# Patient Record
Sex: MALE | Race: WHITE | NOT HISPANIC OR LATINO | URBAN - METROPOLITAN AREA
[De-identification: names, ages, dates, MRNs, and addresses within clinical notes are randomized per-mention and may not be internally consistent; named-entity substitution may affect disease eponyms.]

---

## 2017-01-10 ENCOUNTER — OUTPATIENT (OUTPATIENT)
Dept: OUTPATIENT SERVICES | Facility: HOSPITAL | Age: 77
LOS: 1 days | End: 2017-01-10
Payer: MEDICARE

## 2017-01-10 PROCEDURE — 70551 MRI BRAIN STEM W/O DYE: CPT

## 2017-01-10 PROCEDURE — 70551 MRI BRAIN STEM W/O DYE: CPT | Mod: 26

## 2017-01-11 ENCOUNTER — APPOINTMENT (OUTPATIENT)
Dept: OPHTHALMOLOGY | Facility: CLINIC | Age: 77
End: 2017-01-11

## 2017-02-01 ENCOUNTER — APPOINTMENT (OUTPATIENT)
Dept: OPHTHALMOLOGY | Facility: CLINIC | Age: 77
End: 2017-02-01

## 2017-03-23 ENCOUNTER — APPOINTMENT (OUTPATIENT)
Dept: NEUROLOGY | Facility: CLINIC | Age: 77
End: 2017-03-23

## 2017-04-12 ENCOUNTER — APPOINTMENT (OUTPATIENT)
Dept: OPHTHALMOLOGY | Facility: CLINIC | Age: 77
End: 2017-04-12

## 2017-04-27 ENCOUNTER — APPOINTMENT (OUTPATIENT)
Dept: NEUROLOGY | Facility: CLINIC | Age: 77
End: 2017-04-27

## 2017-04-27 VITALS
WEIGHT: 161 LBS | HEIGHT: 74 IN | SYSTOLIC BLOOD PRESSURE: 98 MMHG | BODY MASS INDEX: 20.66 KG/M2 | HEART RATE: 59 BPM | TEMPERATURE: 96.3 F | DIASTOLIC BLOOD PRESSURE: 62 MMHG | OXYGEN SATURATION: 99 %

## 2017-04-27 DIAGNOSIS — R41.3 OTHER AMNESIA: ICD-10-CM

## 2017-05-16 ENCOUNTER — OTHER (OUTPATIENT)
Age: 77
End: 2017-05-16

## 2017-06-07 ENCOUNTER — APPOINTMENT (OUTPATIENT)
Dept: OPHTHALMOLOGY | Facility: CLINIC | Age: 77
End: 2017-06-07

## 2017-06-07 DIAGNOSIS — H35.371 PUCKERING OF MACULA, RIGHT EYE: ICD-10-CM

## 2017-06-08 RX ORDER — TRAVOPROST 0.04 MG/ML
0 SOLUTION/ DROPS OPHTHALMIC
Qty: 2 | Refills: 11 | Status: ACTIVE | COMMUNITY
Start: 2017-02-01 | End: 1900-01-01

## 2017-10-03 ENCOUNTER — APPOINTMENT (OUTPATIENT)
Dept: HEART AND VASCULAR | Facility: CLINIC | Age: 77
End: 2017-10-03
Payer: MEDICARE

## 2017-10-03 VITALS
TEMPERATURE: 98.7 F | DIASTOLIC BLOOD PRESSURE: 66 MMHG | SYSTOLIC BLOOD PRESSURE: 100 MMHG | HEIGHT: 74 IN | RESPIRATION RATE: 12 BRPM | WEIGHT: 162 LBS | OXYGEN SATURATION: 95 % | HEART RATE: 64 BPM | BODY MASS INDEX: 20.79 KG/M2

## 2017-10-03 DIAGNOSIS — R42 DIZZINESS AND GIDDINESS: ICD-10-CM

## 2017-10-03 DIAGNOSIS — F32.9 MAJOR DEPRESSIVE DISORDER, SINGLE EPISODE, UNSPECIFIED: ICD-10-CM

## 2017-10-03 DIAGNOSIS — H40.9 UNSPECIFIED GLAUCOMA: ICD-10-CM

## 2017-10-03 PROCEDURE — 93000 ELECTROCARDIOGRAM COMPLETE: CPT

## 2017-10-03 PROCEDURE — 99214 OFFICE O/P EST MOD 30 MIN: CPT | Mod: 25

## 2017-10-03 PROCEDURE — 36415 COLL VENOUS BLD VENIPUNCTURE: CPT

## 2017-10-03 RX ORDER — BUPROPION HYDROCHLORIDE 75 MG/1
75 TABLET, FILM COATED ORAL
Qty: 60 | Refills: 3 | Status: DISCONTINUED | COMMUNITY
Start: 2017-04-27 | End: 2017-10-03

## 2017-10-03 RX ORDER — BUPROPION HYDROCHLORIDE 100 MG/1
100 TABLET, FILM COATED, EXTENDED RELEASE ORAL TWICE DAILY
Qty: 60 | Refills: 0 | Status: ACTIVE | COMMUNITY
Start: 2017-09-26

## 2017-10-04 ENCOUNTER — APPOINTMENT (OUTPATIENT)
Dept: OPHTHALMOLOGY | Facility: CLINIC | Age: 77
End: 2017-10-04
Payer: MEDICARE

## 2017-10-04 DIAGNOSIS — H35.371 PUCKERING OF MACULA, RIGHT EYE: ICD-10-CM

## 2017-10-04 DIAGNOSIS — H43.813 VITREOUS DEGENERATION, BILATERAL: ICD-10-CM

## 2017-10-04 LAB
25(OH)D3 SERPL-MCNC: 31.5 NG/ML
ALBUMIN SERPL ELPH-MCNC: 4.3 G/DL
ALP BLD-CCNC: 55 U/L
ALT SERPL-CCNC: 17 U/L
ANION GAP SERPL CALC-SCNC: 15 MMOL/L
APPEARANCE: CLEAR
AST SERPL-CCNC: 21 U/L
BACTERIA: NEGATIVE
BASOPHILS # BLD AUTO: 0.03 K/UL
BASOPHILS NFR BLD AUTO: 0.7 %
BILIRUB SERPL-MCNC: 0.3 MG/DL
BILIRUBIN URINE: NEGATIVE
BLOOD URINE: NEGATIVE
BUN SERPL-MCNC: 29 MG/DL
CALCIUM SERPL-MCNC: 10 MG/DL
CHLORIDE SERPL-SCNC: 106 MMOL/L
CHOLEST SERPL-MCNC: 160 MG/DL
CHOLEST/HDLC SERPL: 3.1 RATIO
CO2 SERPL-SCNC: 24 MMOL/L
COLOR: YELLOW
CREAT SERPL-MCNC: 1.34 MG/DL
EOSINOPHIL # BLD AUTO: 0.19 K/UL
EOSINOPHIL NFR BLD AUTO: 4.1 %
GLUCOSE QUALITATIVE U: NEGATIVE MG/DL
GLUCOSE SERPL-MCNC: 70 MG/DL
HBA1C MFR BLD HPLC: 5.7 %
HCT VFR BLD CALC: 40.3 %
HDLC SERPL-MCNC: 52 MG/DL
HGB BLD-MCNC: 12.6 G/DL
IMM GRANULOCYTES NFR BLD AUTO: 0.2 %
KETONES URINE: NEGATIVE
LDLC SERPL CALC-MCNC: 96 MG/DL
LEUKOCYTE ESTERASE URINE: NEGATIVE
LYMPHOCYTES # BLD AUTO: 1.02 K/UL
LYMPHOCYTES NFR BLD AUTO: 22.2 %
MAGNESIUM SERPL-MCNC: 2.3 MG/DL
MAN DIFF?: NORMAL
MCHC RBC-ENTMCNC: 30.2 PG
MCHC RBC-ENTMCNC: 31.3 GM/DL
MCV RBC AUTO: 96.6 FL
MICROSCOPIC-UA: NORMAL
MONOCYTES # BLD AUTO: 0.48 K/UL
MONOCYTES NFR BLD AUTO: 10.5 %
NEUTROPHILS # BLD AUTO: 2.86 K/UL
NEUTROPHILS NFR BLD AUTO: 62.3 %
NITRITE URINE: NEGATIVE
PH URINE: 6.5
PLATELET # BLD AUTO: 151 K/UL
POTASSIUM SERPL-SCNC: 4.8 MMOL/L
PROT SERPL-MCNC: 6.7 G/DL
PROTEIN URINE: NEGATIVE MG/DL
RBC # BLD: 4.17 M/UL
RBC # FLD: 14.2 %
RED BLOOD CELLS URINE: 6 /HPF
SODIUM SERPL-SCNC: 145 MMOL/L
SPECIFIC GRAVITY URINE: 1.02
SQUAMOUS EPITHELIAL CELLS: 0 /HPF
TRIGL SERPL-MCNC: 62 MG/DL
TSH SERPL-ACNC: 0.97 UIU/ML
UROBILINOGEN URINE: NEGATIVE MG/DL
WBC # FLD AUTO: 4.59 K/UL
WHITE BLOOD CELLS URINE: 1 /HPF

## 2017-10-04 PROCEDURE — 92014 COMPRE OPH EXAM EST PT 1/>: CPT

## 2017-10-04 PROCEDURE — 92133 CPTRZD OPH DX IMG PST SGM ON: CPT

## 2017-10-12 ENCOUNTER — APPOINTMENT (OUTPATIENT)
Dept: HEART AND VASCULAR | Facility: CLINIC | Age: 77
End: 2017-10-12
Payer: MEDICARE

## 2017-10-12 ENCOUNTER — APPOINTMENT (OUTPATIENT)
Dept: HEART AND VASCULAR | Facility: CLINIC | Age: 77
End: 2017-10-12

## 2017-10-12 VITALS
BODY MASS INDEX: 20.12 KG/M2 | RESPIRATION RATE: 14 BRPM | HEART RATE: 57 BPM | OXYGEN SATURATION: 100 % | TEMPERATURE: 97.8 F | WEIGHT: 156.8 LBS | DIASTOLIC BLOOD PRESSURE: 60 MMHG | HEIGHT: 74 IN | SYSTOLIC BLOOD PRESSURE: 108 MMHG

## 2017-10-12 DIAGNOSIS — I36.1 NONRHEUMATIC TRICUSPID (VALVE) INSUFFICIENCY: ICD-10-CM

## 2017-10-12 DIAGNOSIS — I65.29 OCCLUSION AND STENOSIS OF UNSPECIFIED CAROTID ARTERY: ICD-10-CM

## 2017-10-12 PROCEDURE — 99214 OFFICE O/P EST MOD 30 MIN: CPT

## 2017-10-12 RX ORDER — METAPROTERENOL SULFATE 10 MG
500 TABLET ORAL
Qty: 90 | Refills: 1 | Status: DISCONTINUED | COMMUNITY
Start: 2017-10-03 | End: 2017-10-12

## 2017-10-12 RX ORDER — CHLORHEXIDINE GLUCONATE 4 %
250 LIQUID (ML) TOPICAL
Refills: 0 | Status: DISCONTINUED | COMMUNITY
End: 2017-10-12

## 2017-11-21 ENCOUNTER — INPATIENT (INPATIENT)
Facility: HOSPITAL | Age: 77
LOS: 0 days | Discharge: HOME | DRG: 812 | End: 2017-11-22
Attending: INTERNAL MEDICINE | Admitting: INTERNAL MEDICINE
Payer: COMMERCIAL

## 2017-11-21 VITALS
WEIGHT: 160.06 LBS | OXYGEN SATURATION: 96 % | SYSTOLIC BLOOD PRESSURE: 120 MMHG | TEMPERATURE: 97 F | DIASTOLIC BLOOD PRESSURE: 65 MMHG | HEART RATE: 58 BPM | RESPIRATION RATE: 18 BRPM

## 2017-11-21 DIAGNOSIS — D64.9 ANEMIA, UNSPECIFIED: ICD-10-CM

## 2017-11-21 DIAGNOSIS — K92.2 GASTROINTESTINAL HEMORRHAGE, UNSPECIFIED: ICD-10-CM

## 2017-11-21 DIAGNOSIS — J11.1 INFLUENZA DUE TO UNIDENTIFIED INFLUENZA VIRUS WITH OTHER RESPIRATORY MANIFESTATIONS: ICD-10-CM

## 2017-11-21 DIAGNOSIS — D69.6 THROMBOCYTOPENIA, UNSPECIFIED: ICD-10-CM

## 2017-11-21 DIAGNOSIS — R79.89 OTHER SPECIFIED ABNORMAL FINDINGS OF BLOOD CHEMISTRY: ICD-10-CM

## 2017-11-21 LAB
ALBUMIN SERPL ELPH-MCNC: 3.4 G/DL — SIGNIFICANT CHANGE UP (ref 3.3–5)
ALP SERPL-CCNC: 49 U/L — SIGNIFICANT CHANGE UP (ref 40–120)
ALT FLD-CCNC: 20 U/L — SIGNIFICANT CHANGE UP (ref 10–45)
ANION GAP SERPL CALC-SCNC: 11 MMOL/L — SIGNIFICANT CHANGE UP (ref 5–17)
APTT BLD: 30 SEC — SIGNIFICANT CHANGE UP (ref 27.5–37.4)
AST SERPL-CCNC: 23 U/L — SIGNIFICANT CHANGE UP (ref 10–40)
BASOPHILS NFR BLD AUTO: 0.2 % — SIGNIFICANT CHANGE UP (ref 0–2)
BILIRUB SERPL-MCNC: 0.6 MG/DL — SIGNIFICANT CHANGE UP (ref 0.2–1.2)
BLD GP AB SCN SERPL QL: NEGATIVE — SIGNIFICANT CHANGE UP
BUN SERPL-MCNC: 23 MG/DL — SIGNIFICANT CHANGE UP (ref 7–23)
CALCIUM SERPL-MCNC: 8.5 MG/DL — SIGNIFICANT CHANGE UP (ref 8.4–10.5)
CHLORIDE SERPL-SCNC: 101 MMOL/L — SIGNIFICANT CHANGE UP (ref 96–108)
CO2 SERPL-SCNC: 24 MMOL/L — SIGNIFICANT CHANGE UP (ref 22–31)
CREAT SERPL-MCNC: 1.31 MG/DL — HIGH (ref 0.5–1.3)
EOSINOPHIL NFR BLD AUTO: 0.9 % — SIGNIFICANT CHANGE UP (ref 0–6)
GLUCOSE SERPL-MCNC: 95 MG/DL — SIGNIFICANT CHANGE UP (ref 70–99)
HCT VFR BLD CALC: 34.1 % — LOW (ref 39–50)
HGB BLD-MCNC: 11.4 G/DL — LOW (ref 13–17)
INR BLD: 1.34 — HIGH (ref 0.88–1.16)
LACTATE SERPL-SCNC: 1.1 MMOL/L — SIGNIFICANT CHANGE UP (ref 0.5–2)
LG PLATELETS BLD QL AUTO: PRESENT — SIGNIFICANT CHANGE UP
LYMPHOCYTES # BLD AUTO: 10.5 % — LOW (ref 13–44)
LYMPHOCYTES # BLD AUTO: 12 % — LOW (ref 13–44)
MANUAL SMEAR VERIFICATION: SIGNIFICANT CHANGE UP
MCHC RBC-ENTMCNC: 30.4 PG — SIGNIFICANT CHANGE UP (ref 27–34)
MCHC RBC-ENTMCNC: 33.4 G/DL — SIGNIFICANT CHANGE UP (ref 32–36)
MCV RBC AUTO: 90.9 FL — SIGNIFICANT CHANGE UP (ref 80–100)
MONOCYTES NFR BLD AUTO: 12 % — SIGNIFICANT CHANGE UP (ref 2–14)
MONOCYTES NFR BLD AUTO: 18.3 % — HIGH (ref 2–14)
NEUTROPHILS NFR BLD AUTO: 70.1 % — SIGNIFICANT CHANGE UP (ref 43–77)
NEUTROPHILS NFR BLD AUTO: 74 % — SIGNIFICANT CHANGE UP (ref 43–77)
NEUTS BAND # BLD: 2 % — SIGNIFICANT CHANGE UP
OB PNL STL: NEGATIVE — SIGNIFICANT CHANGE UP
PLAT MORPH BLD: NORMAL — SIGNIFICANT CHANGE UP
PLATELET # BLD AUTO: 93 K/UL — LOW (ref 150–400)
POTASSIUM SERPL-MCNC: 4 MMOL/L — SIGNIFICANT CHANGE UP (ref 3.5–5.3)
POTASSIUM SERPL-SCNC: 4 MMOL/L — SIGNIFICANT CHANGE UP (ref 3.5–5.3)
PROT SERPL-MCNC: 6.4 G/DL — SIGNIFICANT CHANGE UP (ref 6–8.3)
PROTHROM AB SERPL-ACNC: 15 SEC — HIGH (ref 9.8–12.7)
RBC # BLD: 3.75 M/UL — LOW (ref 4.2–5.8)
RBC # FLD: 13.4 % — SIGNIFICANT CHANGE UP (ref 10.3–16.9)
RBC BLD AUTO: NORMAL — SIGNIFICANT CHANGE UP
RH IG SCN BLD-IMP: POSITIVE — SIGNIFICANT CHANGE UP
RH IG SCN BLD-IMP: POSITIVE — SIGNIFICANT CHANGE UP
SODIUM SERPL-SCNC: 136 MMOL/L — SIGNIFICANT CHANGE UP (ref 135–145)
WBC # BLD: 6.6 K/UL — SIGNIFICANT CHANGE UP (ref 3.8–10.5)
WBC # FLD AUTO: 6.6 K/UL — SIGNIFICANT CHANGE UP (ref 3.8–10.5)

## 2017-11-21 PROCEDURE — 99285 EMERGENCY DEPT VISIT HI MDM: CPT | Mod: 25

## 2017-11-21 PROCEDURE — 93010 ELECTROCARDIOGRAM REPORT: CPT

## 2017-11-21 RX ORDER — SODIUM CHLORIDE 9 MG/ML
1000 INJECTION INTRAMUSCULAR; INTRAVENOUS; SUBCUTANEOUS ONCE
Qty: 0 | Refills: 0 | Status: COMPLETED | OUTPATIENT
Start: 2017-11-21 | End: 2017-11-21

## 2017-11-21 RX ORDER — BUPROPION HYDROCHLORIDE 150 MG/1
150 TABLET, EXTENDED RELEASE ORAL DAILY
Qty: 0 | Refills: 0 | Status: DISCONTINUED | OUTPATIENT
Start: 2017-11-21 | End: 2017-11-21

## 2017-11-21 RX ORDER — BUPROPION HYDROCHLORIDE 150 MG/1
100 TABLET, EXTENDED RELEASE ORAL
Qty: 0 | Refills: 0 | Status: DISCONTINUED | OUTPATIENT
Start: 2017-11-21 | End: 2017-11-22

## 2017-11-21 RX ORDER — ACETAMINOPHEN 500 MG
650 TABLET ORAL EVERY 6 HOURS
Qty: 0 | Refills: 0 | Status: DISCONTINUED | OUTPATIENT
Start: 2017-11-21 | End: 2017-11-22

## 2017-11-21 RX ORDER — BUPROPION HYDROCHLORIDE 150 MG/1
100 TABLET, EXTENDED RELEASE ORAL DAILY
Qty: 0 | Refills: 0 | Status: DISCONTINUED | OUTPATIENT
Start: 2017-11-21 | End: 2017-11-21

## 2017-11-21 RX ORDER — BUPROPION HYDROCHLORIDE 150 MG/1
0 TABLET, EXTENDED RELEASE ORAL
Qty: 0 | Refills: 0 | COMMUNITY

## 2017-11-21 RX ORDER — PANTOPRAZOLE SODIUM 20 MG/1
8 TABLET, DELAYED RELEASE ORAL
Qty: 80 | Refills: 0 | Status: DISCONTINUED | OUTPATIENT
Start: 2017-11-21 | End: 2017-11-22

## 2017-11-21 RX ORDER — PANTOPRAZOLE SODIUM 20 MG/1
80 TABLET, DELAYED RELEASE ORAL ONCE
Qty: 0 | Refills: 0 | Status: COMPLETED | OUTPATIENT
Start: 2017-11-21 | End: 2017-11-21

## 2017-11-21 RX ORDER — SODIUM CHLORIDE 9 MG/ML
1000 INJECTION INTRAMUSCULAR; INTRAVENOUS; SUBCUTANEOUS
Qty: 0 | Refills: 0 | Status: DISCONTINUED | OUTPATIENT
Start: 2017-11-21 | End: 2017-11-22

## 2017-11-21 RX ORDER — HEPARIN SODIUM 5000 [USP'U]/ML
5000 INJECTION INTRAVENOUS; SUBCUTANEOUS EVERY 8 HOURS
Qty: 0 | Refills: 0 | Status: DISCONTINUED | OUTPATIENT
Start: 2017-11-21 | End: 2017-11-21

## 2017-11-21 RX ORDER — INFLUENZA VIRUS VACCINE 15; 15; 15; 15 UG/.5ML; UG/.5ML; UG/.5ML; UG/.5ML
0.5 SUSPENSION INTRAMUSCULAR ONCE
Qty: 0 | Refills: 0 | Status: COMPLETED | OUTPATIENT
Start: 2017-11-21 | End: 2017-11-21

## 2017-11-21 RX ADMIN — BUPROPION HYDROCHLORIDE 100 MILLIGRAM(S): 150 TABLET, EXTENDED RELEASE ORAL at 23:27

## 2017-11-21 RX ADMIN — PANTOPRAZOLE SODIUM 10 MG/HR: 20 TABLET, DELAYED RELEASE ORAL at 16:25

## 2017-11-21 RX ADMIN — SODIUM CHLORIDE 110 MILLILITER(S): 9 INJECTION INTRAMUSCULAR; INTRAVENOUS; SUBCUTANEOUS at 18:48

## 2017-11-21 RX ADMIN — SODIUM CHLORIDE 1000 MILLILITER(S): 9 INJECTION INTRAMUSCULAR; INTRAVENOUS; SUBCUTANEOUS at 15:25

## 2017-11-21 RX ADMIN — Medication 30 MILLIGRAM(S): at 19:43

## 2017-11-21 RX ADMIN — PANTOPRAZOLE SODIUM 80 MILLIGRAM(S): 20 TABLET, DELAYED RELEASE ORAL at 16:25

## 2017-11-21 NOTE — ED ADULT NURSE NOTE - OBJECTIVE STATEMENT
Pt BIBA to ED A&Ox3 from urgent care. pt states he hasn't been feeling well since Friday, reports cough and weakness. pt also reports diarrhea since yesterday, 2 episodes of dark stools today. per wife pt had +flu test and +guaiac at urgent care. per ems pt was hypotensive at urgent care sbp 80s. pt denies cp/sob, dizziness, n/v.

## 2017-11-21 NOTE — H&P ADULT - NSHPPHYSICALEXAM_GEN_ALL_CORE
.  VITAL SIGNS:  T(C): 36.1 (11-21-17 @ 18:28), Max: 36.7 (11-21-17 @ 15:22)  T(F): 97 (11-21-17 @ 18:28), Max: 98.1 (11-21-17 @ 15:22)  HR: 61 (11-21-17 @ 18:28) (54 - 61)  BP: 140/80 (11-21-17 @ 18:28) (118/70 - 140/80)  BP(mean): --  RR: 16 (11-21-17 @ 18:28) (16 - 18)  SpO2: 99% (11-21-17 @ 18:28) (96% - 99%)  Wt(kg): --    PHYSICAL EXAM:    Constitutional: WDWN resting comfortably in bed; NAD, odd affect, non toxic, slow speech pattern  Head: NC/AT  Eyes: PERRL, EOMI, anicteric sclera  ENT: no nasal discharge; uvula midline, no oropharyngeal erythema or exudates; MMM  Neck: supple; no JVD  Respiratory: CTA B/L; no W/R/R, no retractions  Cardiac: +S1/S2; reg rate, irregular rhythm  Gastrointestinal: soft, NT/ND; no rebound or guarding; +BSx4  Back: spine midline, no bony tenderness or step-offs; no CVAT B/L  Extremities: WWP, no clubbing or cyanosis; no peripheral edema  Musculoskeletal: NROM x4; no joint swelling, tenderness or erythema  Vascular: 2+ DPP BL  Dermatologic: skin warm, dry and intact  Lymphatic: +BL submandibular LAD, non tender  Neurologic: AAOx3; CNII-XII grossly intact; no focal deficits  Psychiatric: odd affect .  VITAL SIGNS:  T(C): 36.1 (11-21-17 @ 18:28), Max: 36.7 (11-21-17 @ 15:22)  T(F): 97 (11-21-17 @ 18:28), Max: 98.1 (11-21-17 @ 15:22)  HR: 61 (11-21-17 @ 18:28) (54 - 61)  BP: 140/80 (11-21-17 @ 18:28) (118/70 - 140/80)  BP(mean): --  RR: 16 (11-21-17 @ 18:28) (16 - 18)  SpO2: 99% (11-21-17 @ 18:28) (96% - 99%)  Wt(kg): --    PHYSICAL EXAM:    Constitutional: WDWN resting comfortably in bed; NAD, odd affect, non toxic, slow speech pattern  Head: NC/AT  Eyes: PERRL, EOMI, anicteric sclera  ENT: no nasal discharge; uvula midline, no oropharyngeal erythema or exudates; MMM  Neck: supple; no JVD  Respiratory: CTA B/L; no W/R/R, no retractions  Cardiac: +S1/S2; reg rate, irregular rhythm  Gastrointestinal: soft, NT/ND; no rebound or guarding; +BSx4  Back: spine midline, no bony tenderness or step-offs; no CVAT B/L  Extremities: WWP, no clubbing or cyanosis; no peripheral edema  Musculoskeletal: NROM x4; no joint swelling, tenderness or erythema  Vascular: 2+ DPP BL  Dermatologic: skin warm, dry and intact  Lymphatic: +BL submandibular LAD, non tender  Neurologic: AAOx3; CNII-XII grossly intact; no focal deficits  Psychiatric: odd affect, AOx3, slow terse responses

## 2017-11-21 NOTE — H&P ADULT - PROBLEM SELECTOR PLAN 4
PLt 93. Unclear etiology. Not likely side effect of wellbutrin. May be 2/2 acute infection.  - check non EDTA blue top tube for PLt count in AM in case assay issue  - will check peripheral smear and fibrinogen, low suspicion DIC or TTP

## 2017-11-21 NOTE — ED PROVIDER NOTE - MEDICAL DECISION MAKING DETAILS
pt with flu-like symptoms positive for flu at UC also with bp 80s - responded to fluids with increase in bp, also states diarrhea was black and tarry this morning no prior hx of scope - brown stool in ED.  Discussed with Dr. Brewer who requests Dr. Kwon and Dr. Clarke for admission.

## 2017-11-21 NOTE — H&P ADULT - PROBLEM SELECTOR PLAN 5
Cr 1.32. BUN wnl. Cr 1.32. BUN wnl. Pt not dry now after IVF may have been earlier in setting of decreased appetite and infection.  - trend Cr  - fu U lytes  - IVF as above  - obtain UA    FEN. Regular diet. NPO past midnight. Replete lytes K>4, MG>2. IVF as above    PPx. SCDs in setting of possible GIB    Dispo. Pending clinical improvement

## 2017-11-21 NOTE — H&P ADULT - PROBLEM SELECTOR PLAN 1
Not meeting sepsis criteria now. s/p 2L IV NS in ED and EMS today.  - start tamiflu renal dosing 30 bid as pt with ongoing/complicated/worsening Sx  - IV  cc/hr  - tylenol prn for pain/fever, comfortable now  - isolation Not meeting sepsis criteria now. s/p 2L IV NS in ED and EMS today. Diarrhea likely 2/2 flu vs GIB. Unclear if hypotensive earlier at urgent care, normotensive now, not tachy, NAD on exam, at baseline mental status.  - start tamiflu renal dosing 30 bid as pt with ongoing/complicated/worsening Sx  - IV  cc/hr  - tylenol prn for pain/fever, comfortable now  - isolation

## 2017-11-21 NOTE — H&P ADULT - PROBLEM SELECTOR PLAN 3
normocytic. hgb 11 range. no known baseline. possible GIB vs chronic dz.  - iron studies  - GIB Tx as above  - TSH, b12, folate  - maintain active T&S

## 2017-11-21 NOTE — H&P ADULT - PROBLEM SELECTOR PLAN 2
Concern for GIB.  - fu DR. Clarke recs  - PPI gtt continued  - npo after midnight in case intervention Concern for GIB. Hemodynamically stable. Unclear if made black or dark brown stool at home. Hemorrhoids may complicate picture. No prior c-scope.  - fu Dr. Clarke recs  - PPI gtt continued  - npo after midnight in case intervention

## 2017-11-21 NOTE — ED ADULT TRIAGE NOTE - OTHER COMPLAINTS
biba from doctors for two episodes of dark tarry stools this morning, per EMS pt was hypotensive in office in 60's, also c/o of cough x 5 days with fever

## 2017-11-21 NOTE — H&P ADULT - ASSESSMENT
78 yo M glaucoma, on wellbutrin for unclear reasons at this time, hemorrhoids presents with 5 days of malaise/dry cough/runny nose/diarrhea AND dark brown/black stool noted today. Measured home fever this AM to 100.8. Flu + at urgent care.

## 2017-11-21 NOTE — H&P ADULT - HISTORY OF PRESENT ILLNESS
76 yo M glaucoma, on wellbutrin for unclear reasons at this time, hemorrhoids presents with 5 days of malaise/dry cough/runny nose/diarrhea AND dark brown/black stool noted today. Measured home fever this AM to 100.8. Avoids doctors and didn;t seek care until william tto urgent care today where they measured SBP in 80s, found black stool and guaic +. SEnt to ED, received 1L IV NS in ambulance and 1L in ED. In, ongoing diarrhea, brown stool only, guaic neg. On phone wife reports dark brown stool in recent days no black stool. No prior c scope or EGD. Previously pt in usual state of health, no acute illness recently. No chills. Pt feels SX slightly but not overtly worse past few days. No HA/vision changes/CP/SOB/sputum production/abd pain/N/V/dysuria/leg pain. Decreased appetite recently. Flu + at urgent care.    In ED:  Vital Signs Last 24 Hrs  T(C): 36.1 (21 Nov 2017 18:28), Max: 36.7 (21 Nov 2017 15:22)  T(F): 97 (21 Nov 2017 18:28), Max: 98.1 (21 Nov 2017 15:22)  HR: 61 (21 Nov 2017 18:28) (54 - 61)  BP: 140/80 (21 Nov 2017 18:28) (118/70 - 140/80)  BP(mean): --  RR: 16 (21 Nov 2017 18:28) (16 - 18)  SpO2: 99% (21 Nov 2017 18:28) (96% - 99%)    Received 1L IV NS. Dr. Clarke notified and will fu. Started PPI gtt.

## 2017-11-21 NOTE — ED PROVIDER NOTE - OBJECTIVE STATEMENT
78 y/o m presents to ED with two complaints: 1 - fever, chills, bodyaches, congestion, cough x 5 days (seen at UC prior to eval and flu positive).  Pt did not receive flu vaccine.  Pt also complains of multiple episodes of diarrhea that were dark in color this morning.  Pt had guiac done at  which was positive for blood.  Pt denies ever having colonscopy.  Denies gastritis/ulcer or alcohol history.  Denies vomiting or hematemesis. 76 y/o m presents to ED with two complaints: 1 - fever, chills, bodyaches, congestion, cough x 5 days (seen at UC prior to eval and flu positive).  Pt also found to have bp in 80s at .  Pt did not receive flu vaccine.  Pt also complains of multiple episodes of diarrhea that were dark in color this morning.  Pt had guiac done at  which was positive for blood.  Pt denies ever having colonscopy.  Denies gastritis/ulcer or alcohol history.  Denies vomiting or hematemesis.

## 2017-11-21 NOTE — H&P ADULT - NSHPLABSRESULTS_GEN_ALL_CORE
.  LABS:                         11.4   6.6   )-----------( 93       ( 21 Nov 2017 15:04 )             34.1     11-21    136  |  101  |  23  ----------------------------<  95  4.0   |  24  |  1.31<H>    Ca    8.5      21 Nov 2017 15:04    TPro  6.4  /  Alb  3.4  /  TBili  0.6  /  DBili  x   /  AST  23  /  ALT  20  /  AlkPhos  49  11-21    PT/INR - ( 21 Nov 2017 15:04 )   PT: 15.0 sec;   INR: 1.34          PTT - ( 21 Nov 2017 15:04 )  PTT:30.0 sec          Lactate, Blood: 1.1 mmoL/L (11-21 @ 15:53)      RADIOLOGY, EKG & ADDITIONAL TESTS: Reviewed.     ECG: sinus ayah to 50s, no acute ischemic STT changes, qtc wnl

## 2017-11-22 ENCOUNTER — TRANSCRIPTION ENCOUNTER (OUTPATIENT)
Age: 77
End: 2017-11-22

## 2017-11-22 VITALS
RESPIRATION RATE: 16 BRPM | TEMPERATURE: 99 F | OXYGEN SATURATION: 95 % | SYSTOLIC BLOOD PRESSURE: 123 MMHG | DIASTOLIC BLOOD PRESSURE: 59 MMHG | HEART RATE: 55 BPM

## 2017-11-22 LAB
ANION GAP SERPL CALC-SCNC: 12 MMOL/L — SIGNIFICANT CHANGE UP (ref 5–17)
APTT BLD: 28.9 SEC — SIGNIFICANT CHANGE UP (ref 27.5–37.4)
BUN SERPL-MCNC: 19 MG/DL — SIGNIFICANT CHANGE UP (ref 7–23)
CALCIUM SERPL-MCNC: 8.5 MG/DL — SIGNIFICANT CHANGE UP (ref 8.4–10.5)
CHLORIDE SERPL-SCNC: 102 MMOL/L — SIGNIFICANT CHANGE UP (ref 96–108)
CLOSURE TME COLL+EPINEP BLD: 75 K/UL — LOW (ref 150–400)
CO2 SERPL-SCNC: 23 MMOL/L — SIGNIFICANT CHANGE UP (ref 22–31)
CREAT SERPL-MCNC: 1.14 MG/DL — SIGNIFICANT CHANGE UP (ref 0.5–1.3)
FOLATE SERPL-MCNC: 10.7 NG/ML — SIGNIFICANT CHANGE UP (ref 4.8–24.2)
GLUCOSE SERPL-MCNC: 97 MG/DL — SIGNIFICANT CHANGE UP (ref 70–99)
HCT VFR BLD CALC: 34.2 % — LOW (ref 39–50)
HCT VFR BLD CALC: 34.3 % — LOW (ref 39–50)
HGB BLD-MCNC: 11.3 G/DL — LOW (ref 13–17)
HGB BLD-MCNC: 11.9 G/DL — LOW (ref 13–17)
INR BLD: 1.32 — HIGH (ref 0.88–1.16)
IRON SATN MFR SERPL: 10 % — LOW (ref 16–55)
IRON SATN MFR SERPL: 15 UG/DL — LOW (ref 45–165)
MAGNESIUM SERPL-MCNC: 2 MG/DL — SIGNIFICANT CHANGE UP (ref 1.6–2.6)
MCHC RBC-ENTMCNC: 30 PG — SIGNIFICANT CHANGE UP (ref 27–34)
MCHC RBC-ENTMCNC: 31.6 PG — SIGNIFICANT CHANGE UP (ref 27–34)
MCHC RBC-ENTMCNC: 32.9 G/DL — SIGNIFICANT CHANGE UP (ref 32–36)
MCHC RBC-ENTMCNC: 34.8 G/DL — SIGNIFICANT CHANGE UP (ref 32–36)
MCV RBC AUTO: 90.7 FL — SIGNIFICANT CHANGE UP (ref 80–100)
MCV RBC AUTO: 91 FL — SIGNIFICANT CHANGE UP (ref 80–100)
PLATELET # BLD AUTO: 100 K/UL — LOW (ref 150–400)
PLATELET # BLD AUTO: 91 K/UL — LOW (ref 150–400)
POTASSIUM SERPL-MCNC: 3.6 MMOL/L — SIGNIFICANT CHANGE UP (ref 3.5–5.3)
POTASSIUM SERPL-SCNC: 3.6 MMOL/L — SIGNIFICANT CHANGE UP (ref 3.5–5.3)
PROTHROM AB SERPL-ACNC: 14.7 SEC — HIGH (ref 9.8–12.7)
RBC # BLD: 3.77 M/UL — LOW (ref 4.2–5.8)
RBC # BLD: 3.77 M/UL — LOW (ref 4.2–5.8)
RBC # FLD: 13.2 % — SIGNIFICANT CHANGE UP (ref 10.3–16.9)
RBC # FLD: 13.2 % — SIGNIFICANT CHANGE UP (ref 10.3–16.9)
SODIUM SERPL-SCNC: 137 MMOL/L — SIGNIFICANT CHANGE UP (ref 135–145)
TIBC SERPL-MCNC: 146 UG/DL — LOW (ref 220–430)
TSH SERPL-MCNC: 0.79 UIU/ML — SIGNIFICANT CHANGE UP (ref 0.35–4.94)
UIBC SERPL-MCNC: 131 UG/DL — SIGNIFICANT CHANGE UP (ref 110–370)
VIT B12 SERPL-MCNC: 634 PG/ML — SIGNIFICANT CHANGE UP (ref 243–894)
WBC # BLD: 6 K/UL — SIGNIFICANT CHANGE UP (ref 3.8–10.5)
WBC # BLD: 6.4 K/UL — SIGNIFICANT CHANGE UP (ref 3.8–10.5)
WBC # FLD AUTO: 6 K/UL — SIGNIFICANT CHANGE UP (ref 3.8–10.5)
WBC # FLD AUTO: 6.4 K/UL — SIGNIFICANT CHANGE UP (ref 3.8–10.5)

## 2017-11-22 PROCEDURE — 85027 COMPLETE CBC AUTOMATED: CPT

## 2017-11-22 PROCEDURE — 96375 TX/PRO/DX INJ NEW DRUG ADDON: CPT

## 2017-11-22 PROCEDURE — 85730 THROMBOPLASTIN TIME PARTIAL: CPT

## 2017-11-22 PROCEDURE — 82728 ASSAY OF FERRITIN: CPT

## 2017-11-22 PROCEDURE — 96374 THER/PROPH/DIAG INJ IV PUSH: CPT

## 2017-11-22 PROCEDURE — 83550 IRON BINDING TEST: CPT

## 2017-11-22 PROCEDURE — 80053 COMPREHEN METABOLIC PANEL: CPT

## 2017-11-22 PROCEDURE — 86900 BLOOD TYPING SEROLOGIC ABO: CPT

## 2017-11-22 PROCEDURE — 85025 COMPLETE CBC W/AUTO DIFF WBC: CPT

## 2017-11-22 PROCEDURE — 80048 BASIC METABOLIC PNL TOTAL CA: CPT

## 2017-11-22 PROCEDURE — 99285 EMERGENCY DEPT VISIT HI MDM: CPT | Mod: 25

## 2017-11-22 PROCEDURE — 83605 ASSAY OF LACTIC ACID: CPT

## 2017-11-22 PROCEDURE — 84443 ASSAY THYROID STIM HORMONE: CPT

## 2017-11-22 PROCEDURE — 86850 RBC ANTIBODY SCREEN: CPT

## 2017-11-22 PROCEDURE — 82746 ASSAY OF FOLIC ACID SERUM: CPT

## 2017-11-22 PROCEDURE — 85610 PROTHROMBIN TIME: CPT

## 2017-11-22 PROCEDURE — 86901 BLOOD TYPING SEROLOGIC RH(D): CPT

## 2017-11-22 PROCEDURE — 36415 COLL VENOUS BLD VENIPUNCTURE: CPT

## 2017-11-22 PROCEDURE — 82607 VITAMIN B-12: CPT

## 2017-11-22 PROCEDURE — 83735 ASSAY OF MAGNESIUM: CPT

## 2017-11-22 RX ORDER — POTASSIUM CHLORIDE 20 MEQ
40 PACKET (EA) ORAL ONCE
Qty: 0 | Refills: 0 | Status: COMPLETED | OUTPATIENT
Start: 2017-11-22 | End: 2017-11-22

## 2017-11-22 RX ORDER — PANTOPRAZOLE SODIUM 20 MG/1
40 TABLET, DELAYED RELEASE ORAL
Qty: 0 | Refills: 0 | Status: DISCONTINUED | OUTPATIENT
Start: 2017-11-22 | End: 2017-11-22

## 2017-11-22 RX ORDER — PANTOPRAZOLE SODIUM 20 MG/1
1 TABLET, DELAYED RELEASE ORAL
Qty: 30 | Refills: 0 | OUTPATIENT
Start: 2017-11-22 | End: 2017-12-22

## 2017-11-22 RX ADMIN — BUPROPION HYDROCHLORIDE 100 MILLIGRAM(S): 150 TABLET, EXTENDED RELEASE ORAL at 10:05

## 2017-11-22 RX ADMIN — PANTOPRAZOLE SODIUM 40 MILLIGRAM(S): 20 TABLET, DELAYED RELEASE ORAL at 16:19

## 2017-11-22 RX ADMIN — PANTOPRAZOLE SODIUM 10 MG/HR: 20 TABLET, DELAYED RELEASE ORAL at 04:06

## 2017-11-22 RX ADMIN — Medication 40 MILLIEQUIVALENT(S): at 11:38

## 2017-11-22 RX ADMIN — SODIUM CHLORIDE 110 MILLILITER(S): 9 INJECTION INTRAMUSCULAR; INTRAVENOUS; SUBCUTANEOUS at 04:10

## 2017-11-22 RX ADMIN — Medication 30 MILLIGRAM(S): at 06:12

## 2017-11-22 NOTE — PROGRESS NOTE ADULT - PROBLEM SELECTOR PLAN 1
Not meeting sepsis criteria now. s/p 2L IV NS in ED and EMS today. Diarrhea likely 2/2 flu vs GIB. Unclear if hypotensive earlier at urgent care, normotensive now, not tachy, NAD on exam, at baseline mental status.  - start tamiflu renal dosing 30 bid as pt with ongoing/complicated/worsening Sx  - IV  cc/hr  - tylenol prn for pain/fever, comfortable now  - isolation

## 2017-11-22 NOTE — DISCHARGE NOTE ADULT - CARE PLAN
Principal Discharge DX:	Anemia  Goal:	Follow up with Dr. Clarke outpatient on Monday 11/27 at 4pm  Instructions for follow-up, activity and diet:	We were concerned about an acute bleed as you reported a history of seeing dark stools and a guaiac positive test (+ for blood) on your urgent care visit. You reported some weakness which may in part be due to your anemia, but may also be explained by your flu. You reported improvement in your strength with food and fluids. Your labs showed that your blood counts did not drop, suggesting that you are not having a brisk or fast bleed in your gastrointestinal system. Please follow up with Dr. Clarke (Gastroenterologist) outpatient on Monday 11/27 at 4pm for an elective endoscopic procedure. If you experience worsening fatigue, lightheadedness or bright red blood in your stool or vomit, please call 911 or come back to the hospital.    Anemia is the medical term for when a person has too few red blood cells. Red blood cells are the cells in your blood that carry oxygen. If you have too few red blood cells, your body does not get all the oxygen it needs. Most people with anemia have no symptoms. They find out they have it after their doctor does blood tests for another reason. People who do have symptoms might feel tired or weak, especially if they try to exercise or have headaches. If you experience these symptoms you should see your primary care provider for further evaluation.  Secondary Diagnosis:	Influenza  Instructions for follow-up, activity and diet:	You were diagnosed with the flu on your outpatient urgent care visit. We treated you with Tamiflu, an antiviral medication to help your body fight the flu. Please stay hydrated. Principal Discharge DX:	Anemia  Goal:	Follow up with Dr. Clarke outpatient on Monday 11/27 at 4pm. Continue to take protonix daily  Instructions for follow-up, activity and diet:	We were concerned about an acute bleed as you reported a history of seeing dark stools and a guaiac positive test (+ for blood) on your urgent care visit. You reported some weakness which may in part be due to your anemia, but may also be explained by your flu. You reported improvement in your strength with food and fluids. Your labs showed that your blood counts did not drop, suggesting that you are not having a brisk or fast bleed in your gastrointestinal system. Please follow up with Dr. Clarke (Gastroenterologist) outpatient on Monday 11/27 at 4pm for an elective endoscopic procedure. If you experience worsening fatigue, lightheadedness or bright red blood in your stool or vomit, please call 911 or come back to the hospital.    Anemia is the medical term for when a person has too few red blood cells. Red blood cells are the cells in your blood that carry oxygen. If you have too few red blood cells, your body does not get all the oxygen it needs. Most people with anemia have no symptoms. They find out they have it after their doctor does blood tests for another reason. People who do have symptoms might feel tired or weak, especially if they try to exercise or have headaches. If you experience these symptoms you should see your primary care provider for further evaluation.  Secondary Diagnosis:	Influenza  Goal:	Continue tamiflu for 4 more days  Instructions for follow-up, activity and diet:	You were diagnosed with the flu on your outpatient urgent care visit. We treated you with Tamiflu, an antiviral medication to help your body fight the flu. Continue to take 4 more days to complete your course. Please stay hydrated and maintain good oral intake.

## 2017-11-22 NOTE — DISCHARGE NOTE ADULT - CARE PROVIDER_API CALL
Kristopher Clarke), Medicine  [address] [facility] [address]  [place] [address]  Phone: [phone]  Fax: [phone]

## 2017-11-22 NOTE — PROGRESS NOTE ADULT - SUBJECTIVE AND OBJECTIVE BOX
OVERNIGHT EVENTS/SUBJECTIVE/ROS: Admitted to r/o GI bleed, NAEO; pt with no episodes of hematemesis or hematochezia, states dark stool but guaiac neg. ROS otherwise neg    VITAL SIGNS:  Vital Signs Last 24 Hrs  T(C): 37 (22 Nov 2017 09:18), Max: 37.3 (22 Nov 2017 05:12)  T(F): 98.6 (22 Nov 2017 09:18), Max: 99.2 (22 Nov 2017 05:12)  HR: 55 (22 Nov 2017 09:18) (54 - 66)  BP: 123/59 (22 Nov 2017 09:18) (117/56 - 153/81)  BP(mean): 105 (21 Nov 2017 20:04) (105 - 105)  RR: 16 (22 Nov 2017 09:18) (16 - 18)  SpO2: 95% (22 Nov 2017 09:18) (95% - 100%)    PHYSICAL EXAM:    General: pleasant elderly male lying in bed, comfortable, in NAD  HEENT: EOMI, anicteric  Neck: supple  Cardiovascular: S1, S2, RRR  Respiratory: CTA b/l, no w/r/r    Gastrointestinal: soft NTND abdomen +BS  Extremities: no edema b/l UE and LE  Vascular: 2+ pulses radial and DP/PT  Neurological: alert and oriented  MEDICATIONS:  MEDICATIONS  (STANDING):  buPROPion  milliGRAM(s) Oral two times a day  oseltamivir 30 milliGRAM(s) Oral two times a day  pantoprazole Infusion 8 mG/Hr (10 mL/Hr) IV Continuous <Continuous>  sodium chloride 0.9%. 1000 milliLiter(s) (110 mL/Hr) IV Continuous <Continuous>    MEDICATIONS  (PRN):  acetaminophen   Tablet 650 milliGRAM(s) Oral every 6 hours PRN For Temp greater than 38 C (100.4 F)  acetaminophen   Tablet. 650 milliGRAM(s) Oral every 6 hours PRN Mild Pain (1 - 3)      ALLERGIES:  Allergies    No Known Allergies    Intolerances        LABS:                        11.9   6.4   )-----------( 100      ( 22 Nov 2017 10:47 )             34.2     11-22    137  |  102  |  19  ----------------------------<  97  3.6   |  23  |  1.14    Ca    8.5      22 Nov 2017 06:52  Mg     2.0     11-22    TPro  6.4  /  Alb  3.4  /  TBili  0.6  /  DBili  x   /  AST  23  /  ALT  20  /  AlkPhos  49  11-21    PT/INR - ( 22 Nov 2017 06:52 )   PT: 14.7 sec;   INR: 1.32          PTT - ( 22 Nov 2017 06:52 )  PTT:28.9 sec    CAPILLARY BLOOD GLUCOSE          RADIOLOGY & ADDITIONAL TESTS: Reviewed.

## 2017-11-22 NOTE — PROGRESS NOTE ADULT - PROBLEM SELECTOR PLAN 4
PLt 93. Unclear etiology. Not likely side effect of wellbutrin. May be 2/2 acute infection.  - check non EDTA blue top tube for PLt count in AM in case assay issue  - peripheral smear no significant findings  - f/u fibrinogen, low suspicion DIC or TTP

## 2017-11-22 NOTE — DISCHARGE NOTE ADULT - MEDICATION SUMMARY - MEDICATIONS TO TAKE
I will START or STAY ON the medications listed below when I get home from the hospital:    oseltamivir 75 mg oral capsule  -- 1 cap(s) by mouth once a day   -- Indication: For Influenza    pantoprazole 40 mg oral delayed release tablet  -- 1 tab(s) by mouth once a day (before a meal)  -- Indication: For GI bleed    Wellbutrin  -- Indication: For Prophylactic

## 2017-11-22 NOTE — PROGRESS NOTE ADULT - ASSESSMENT
78 yo M glaucoma, on wellbutrin for unclear reasons at this time, hemorrhoids presents with 5 days of malaise/dry cough/runny nose/diarrhea AND dark brown/black stool noted today, r/o GI bleed.

## 2017-11-22 NOTE — DISCHARGE NOTE ADULT - PLAN OF CARE
Follow up with Dr. Clarke outpatient on Monday 11/27 at 4pm We were concerned about an acute bleed as you reported a history of seeing dark stools and a guaiac positive test (+ for blood) on your urgent care visit. You reported some weakness which may in part be due to your anemia, but may also be explained by your flu. You reported improvement in your strength with food and fluids. Your labs showed that your blood counts did not drop, suggesting that you are not having a brisk or fast bleed in your gastrointestinal system. Please follow up with Dr. Clarke (Gastroenterologist) outpatient on Monday 11/27 at 4pm for an elective endoscopic procedure. If you experience worsening fatigue, lightheadedness or bright red blood in your stool or vomit, please call 911 or come back to the hospital.    Anemia is the medical term for when a person has too few red blood cells. Red blood cells are the cells in your blood that carry oxygen. If you have too few red blood cells, your body does not get all the oxygen it needs. Most people with anemia have no symptoms. They find out they have it after their doctor does blood tests for another reason. People who do have symptoms might feel tired or weak, especially if they try to exercise or have headaches. If you experience these symptoms you should see your primary care provider for further evaluation. You were diagnosed with the flu on your outpatient urgent care visit. We treated you with Tamiflu, an antiviral medication to help your body fight the flu. Please stay hydrated. Follow up with Dr. Clarke outpatient on Monday 11/27 at 4pm. Continue to take protonix daily Continue tamiflu for 4 more days You were diagnosed with the flu on your outpatient urgent care visit. We treated you with Tamiflu, an antiviral medication to help your body fight the flu. Continue to take 4 more days to complete your course. Please stay hydrated and maintain good oral intake.

## 2017-11-22 NOTE — PROGRESS NOTE ADULT - PROBLEM SELECTOR PLAN 2
Concern for GIB. Hemodynamically stable. Unclear if made black or dark brown stool at home. Hemorrhoids may complicate picture. No prior c-scope.  - PPI gtt continued, to continue PO PPI on discharge  - repeat CBC stable 11.9, no signs of acute bleeding at this time  - to f/u with GI outpatient

## 2017-11-22 NOTE — DISCHARGE NOTE ADULT - HOSPITAL COURSE
76 yo M glaucoma, on wellbutrin for unclear reasons at this time, hemorrhoids presents with 5 days of malaise/dry cough/runny nose/diarrhea AND dark brown/black stool. Pt visited urgent care where SBP in 80s, found black stool with guaiac +, flu+. He was sent to the ED, received 1L IV NS in ambulance and 1L in ED, where he had ongoing diarrhea, guaiac neg brown stool. Pt with no prior history of colonoscopy or endoscopy. Pt endorsing loss of appetite, weight loss, but no chills. On admission, pt with no signs of acute bleed with improvement of Hgb from 11.3 to 11.9. Pt tolerating PO and reporting increased strength on ambulation with fluids and food. To follow up with Dr. Clarke outpatient for elective scope on Monday, 11/27 at 4pm. 78 yo M glaucoma, on wellbutrin for unclear reasons at this time, hemorrhoids presents with 5 days of malaise/dry cough/runny nose/diarrhea AND dark brown/black stool. Pt visited urgent care where SBP in 80s, found black stool with guaiac +, flu+. He was sent to the ED, received 1L IV NS in ambulance and 1L in ED, where he had ongoing diarrhea, guaiac neg brown stool. Pt with no prior history of colonoscopy or endoscopy. Pt endorsing loss of appetite, weight loss, but no chills. On admission, pt with no signs of acute bleed with improvement of Hgb from 11.3 to 11.9. Pt tolerating PO and reporting increased strength on ambulation with fluids and food. To continue taking oral protonix daily. To follow up with Dr. Clarke outpatient for elective scope on Monday, 11/27 at 4pm.

## 2017-11-22 NOTE — PROGRESS NOTE ADULT - PROBLEM SELECTOR PLAN 5
IMPROVEd. Cr 1.32. BUN wnl. Pt not dry now after IVF may have been earlier in setting of decreased appetite and infection.  - trend Cr, now 1.17  - f/u urine lytes  - IVF as above  - obtain UA    FEN. Regular diet. Replete lytes K>4, MG>2. IVF as above    PPx. SCDs in setting of possible GIB    Dispo. discharge to home, to f/u outpatient GI for scope

## 2017-11-22 NOTE — CONSULT NOTE ADULT - SUBJECTIVE AND OBJECTIVE BOX
78 yo M glaucoma, on wellbutrin for unclear reasons at this time, hemorrhoids presents with 5 days of malaise/dry cough/runny nose/diarrhea AND dark brown/black stool noted today. Measured home fever this AM to 100.8. Avoids doctors and didn;t seek care until william tto urgent care today where they measured SBP in 80s, found black stool and guaic +. SEnt to ED, received 1L IV NS in ambulance and 1L in ED. In, ongoing diarrhea, brown stool only, guaic neg. Wife reports dark brown stool in recent days no black stool and baseline Hgb per wife is 13.  . No prior c scope or EGD. Previously pt in usual state of health, no acute illness recently. He has been off baby aspirin for a year.  Has a history of taking advil given by wife for fever.    REVIEW OF SYSTEMS:  Constitutional: No fever, weight loss or fatigue  ENMT:  No difficulty hearing, tinnitus, vertigo; No sinus or throat pain + URI symptoms  Respiratory: No cough, wheezing, chills or hemoptysis  Cardiovascular: No chest pain, palpitations, dizziness or leg swelling  Gastrointestinal: No abdominal or epigastric pain. No nausea, vomiting or hematemesis; No diarrhea or constipation. No melena or hematochezia.  Skin: No itching, burning, rashes or lesions   Musculoskeletal: No joint pain or swelling; No muscle, back or extremity pain    PAST MEDICAL & SURGICAL HISTORY:  Hemorrhoids  Glaucoma  No significant past surgical history      FAMILY HISTORY:  No pertinent family history in first degree relatives      SOCIAL HISTORY:  Smoking Status: [ ] Current, [ ] Former, [ ] Never  Pack Years:    MEDICATIONS:  MEDICATIONS  (STANDING):  buPROPion  milliGRAM(s) Oral two times a day  oseltamivir 30 milliGRAM(s) Oral two times a day  pantoprazole Infusion 8 mG/Hr (10 mL/Hr) IV Continuous <Continuous>  sodium chloride 0.9%. 1000 milliLiter(s) (110 mL/Hr) IV Continuous <Continuous>    MEDICATIONS  (PRN):  acetaminophen   Tablet 650 milliGRAM(s) Oral every 6 hours PRN For Temp greater than 38 C (100.4 F)  acetaminophen   Tablet. 650 milliGRAM(s) Oral every 6 hours PRN Mild Pain (1 - 3)      Allergies    No Known Allergies    Intolerances        Vital Signs Last 24 Hrs  T(C): 37 (22 Nov 2017 09:18), Max: 37.3 (22 Nov 2017 05:12)  T(F): 98.6 (22 Nov 2017 09:18), Max: 99.2 (22 Nov 2017 05:12)  HR: 55 (22 Nov 2017 09:18) (54 - 66)  BP: 123/59 (22 Nov 2017 09:18) (117/56 - 153/81)  BP(mean): 105 (21 Nov 2017 20:04) (105 - 105)  RR: 16 (22 Nov 2017 09:18) (16 - 18)  SpO2: 95% (22 Nov 2017 09:18) (95% - 100%)    11-21 @ 07:01  -  11-22 @ 07:00  --------------------------------------------------------  IN: 1320 mL / OUT: 0 mL / NET: 1320 mL          PHYSICAL EXAM:    General: thin in no acute distress  HEENT: MMM, conjunctiva and sclera clear  Gastrointestinal: Soft, non-tender non-distended; Normal bowel sounds; No rebound or guarding  Extremities: Normal range of motion, No clubbing, cyanosis or edema  Neurological: Alert and oriented x3  Skin: Warm and dry. No obvious rash      LABS:                        11.3   6.0   )-----------( 91       ( 22 Nov 2017 06:52 )             34.3     11-22    137  |  102  |  19  ----------------------------<  97  3.6   |  23  |  1.14    Ca    8.5      22 Nov 2017 06:52  Mg     2.0     11-22    TPro  6.4  /  Alb  3.4  /  TBili  0.6  /  DBili  x   /  AST  23  /  ALT  20  /  AlkPhos  49  11-21          RADIOLOGY & ADDITIONAL STUDIES:

## 2017-11-27 DIAGNOSIS — Z87.09 PERSONAL HISTORY OF OTHER DISEASES OF THE RESPIRATORY SYSTEM: ICD-10-CM

## 2017-11-27 RX ORDER — OSELTAMIVIR PHOSPHATE 75 MG/1
75 CAPSULE ORAL TWICE DAILY
Qty: 10 | Refills: 0 | Status: ACTIVE | COMMUNITY
Start: 2017-11-27 | End: 1900-01-01

## 2017-11-28 DIAGNOSIS — D64.9 ANEMIA, UNSPECIFIED: ICD-10-CM

## 2017-11-28 DIAGNOSIS — D69.6 THROMBOCYTOPENIA, UNSPECIFIED: ICD-10-CM

## 2017-11-28 DIAGNOSIS — K64.9 UNSPECIFIED HEMORRHOIDS: ICD-10-CM

## 2017-11-28 DIAGNOSIS — R19.5 OTHER FECAL ABNORMALITIES: ICD-10-CM

## 2017-11-28 DIAGNOSIS — J11.1 INFLUENZA DUE TO UNIDENTIFIED INFLUENZA VIRUS WITH OTHER RESPIRATORY MANIFESTATIONS: ICD-10-CM

## 2017-11-28 DIAGNOSIS — H40.9 UNSPECIFIED GLAUCOMA: ICD-10-CM

## 2018-01-05 PROBLEM — H40.9 UNSPECIFIED GLAUCOMA: Chronic | Status: ACTIVE | Noted: 2017-11-21

## 2018-01-09 ENCOUNTER — APPOINTMENT (OUTPATIENT)
Dept: HEART AND VASCULAR | Facility: CLINIC | Age: 78
End: 2018-01-09

## 2018-02-07 ENCOUNTER — APPOINTMENT (OUTPATIENT)
Dept: OPHTHALMOLOGY | Facility: CLINIC | Age: 78
End: 2018-02-07
Payer: MEDICARE

## 2018-02-07 PROCEDURE — 92014 COMPRE OPH EXAM EST PT 1/>: CPT

## 2018-02-07 PROCEDURE — 92083 EXTENDED VISUAL FIELD XM: CPT

## 2018-03-08 ENCOUNTER — APPOINTMENT (OUTPATIENT)
Dept: OPHTHALMOLOGY | Facility: CLINIC | Age: 78
End: 2018-03-08
Payer: MEDICARE

## 2018-03-08 PROCEDURE — 92134 CPTRZ OPH DX IMG PST SGM RTA: CPT

## 2018-03-08 PROCEDURE — 92014 COMPRE OPH EXAM EST PT 1/>: CPT

## 2018-03-08 PROCEDURE — 92136 OPHTHALMIC BIOMETRY: CPT

## 2018-04-20 RX ORDER — DIFLUPREDNATE 0.5 MG/ML
0.05 EMULSION OPHTHALMIC
Qty: 1 | Refills: 3 | Status: ACTIVE | COMMUNITY
Start: 2018-04-20 | End: 1900-01-01

## 2018-04-20 RX ORDER — NEPAFENAC 3 MG/ML
0.3 SUSPENSION/ DROPS OPHTHALMIC
Qty: 1 | Refills: 3 | Status: ACTIVE | COMMUNITY
Start: 2018-04-20 | End: 1900-01-01

## 2018-04-20 RX ORDER — MOXIFLOXACIN OPHTHALMIC 5 MG/ML
0.5 SOLUTION/ DROPS OPHTHALMIC
Qty: 1 | Refills: 1 | Status: ACTIVE | COMMUNITY
Start: 2018-04-20 | End: 1900-01-01

## 2018-04-25 ENCOUNTER — OUTPATIENT (OUTPATIENT)
Dept: OUTPATIENT SERVICES | Facility: HOSPITAL | Age: 78
LOS: 1 days | Discharge: HOME | End: 2018-04-25

## 2018-04-25 ENCOUNTER — APPOINTMENT (OUTPATIENT)
Dept: OPHTHALMOLOGY | Facility: AMBULATORY SURGERY CENTER | Age: 78
End: 2018-04-25
Payer: MEDICARE

## 2018-04-25 PROCEDURE — 66984 XCAPSL CTRC RMVL W/O ECP: CPT | Mod: RT

## 2018-04-26 ENCOUNTER — APPOINTMENT (OUTPATIENT)
Dept: OPHTHALMOLOGY | Facility: CLINIC | Age: 78
End: 2018-04-26
Payer: MEDICARE

## 2018-04-26 PROCEDURE — 99024 POSTOP FOLLOW-UP VISIT: CPT

## 2018-05-04 ENCOUNTER — APPOINTMENT (OUTPATIENT)
Dept: OPHTHALMOLOGY | Facility: CLINIC | Age: 78
End: 2018-05-04
Payer: MEDICARE

## 2018-05-04 PROCEDURE — 99024 POSTOP FOLLOW-UP VISIT: CPT

## 2018-05-24 ENCOUNTER — APPOINTMENT (OUTPATIENT)
Dept: OPHTHALMOLOGY | Facility: CLINIC | Age: 78
End: 2018-05-24
Payer: MEDICARE

## 2018-05-24 DIAGNOSIS — H25.813 COMBINED FORMS OF AGE-RELATED CATARACT, BILATERAL: ICD-10-CM

## 2018-05-24 PROCEDURE — 92250 FUNDUS PHOTOGRAPHY W/I&R: CPT

## 2018-05-24 PROCEDURE — 92235 FLUORESCEIN ANGRPH MLTIFRAME: CPT

## 2018-05-24 PROCEDURE — 92014 COMPRE OPH EXAM EST PT 1/>: CPT | Mod: 24

## 2018-05-24 PROCEDURE — 92225: CPT | Mod: RT

## 2018-06-06 ENCOUNTER — APPOINTMENT (OUTPATIENT)
Dept: OPHTHALMOLOGY | Facility: CLINIC | Age: 78
End: 2018-06-06
Payer: MEDICARE

## 2018-06-06 PROCEDURE — 92014 COMPRE OPH EXAM EST PT 1/>: CPT | Mod: 24

## 2018-06-06 PROCEDURE — 92133 CPTRZD OPH DX IMG PST SGM ON: CPT

## 2018-06-19 ENCOUNTER — APPOINTMENT (OUTPATIENT)
Dept: OPHTHALMOLOGY | Facility: CLINIC | Age: 78
End: 2018-06-19
Payer: MEDICARE

## 2018-06-19 PROCEDURE — 92134 CPTRZ OPH DX IMG PST SGM RTA: CPT

## 2018-06-19 PROCEDURE — 92012 INTRM OPH EXAM EST PATIENT: CPT | Mod: 24

## 2018-06-21 ENCOUNTER — APPOINTMENT (OUTPATIENT)
Dept: OPHTHALMOLOGY | Facility: CLINIC | Age: 78
End: 2018-06-21
Payer: MEDICARE

## 2018-06-21 DIAGNOSIS — H25.13 AGE-RELATED NUCLEAR CATARACT, BILATERAL: ICD-10-CM

## 2018-06-21 DIAGNOSIS — H01.002 UNSPECIFIED BLEPHARITIS RIGHT UPPER EYELID: ICD-10-CM

## 2018-06-21 DIAGNOSIS — H01.001 UNSPECIFIED BLEPHARITIS RIGHT UPPER EYELID: ICD-10-CM

## 2018-06-21 DIAGNOSIS — H01.005 UNSPECIFIED BLEPHARITIS RIGHT UPPER EYELID: ICD-10-CM

## 2018-06-21 DIAGNOSIS — H01.004 UNSPECIFIED BLEPHARITIS RIGHT UPPER EYELID: ICD-10-CM

## 2018-06-21 PROCEDURE — 92012 INTRM OPH EXAM EST PATIENT: CPT | Mod: 24

## 2018-06-21 PROCEDURE — 92134 CPTRZ OPH DX IMG PST SGM RTA: CPT

## 2018-07-10 RX ORDER — BRIMONIDINE TARTRATE 1 MG/ML
0.1 SOLUTION/ DROPS OPHTHALMIC
Qty: 1 | Refills: 3 | Status: ACTIVE | COMMUNITY
Start: 2018-06-07 | End: 1900-01-01

## 2018-07-12 ENCOUNTER — APPOINTMENT (OUTPATIENT)
Dept: OPHTHALMOLOGY | Facility: CLINIC | Age: 78
End: 2018-07-12

## 2018-09-28 PROBLEM — K64.9 UNSPECIFIED HEMORRHOIDS: Chronic | Status: ACTIVE | Noted: 2017-11-21

## 2018-10-09 ENCOUNTER — APPOINTMENT (OUTPATIENT)
Dept: OPHTHALMOLOGY | Facility: CLINIC | Age: 78
End: 2018-10-09
Payer: MEDICARE

## 2018-10-09 PROCEDURE — 92012 INTRM OPH EXAM EST PATIENT: CPT

## 2018-10-09 RX ORDER — BRIMONIDINE TARTRATE 1.5 MG/ML
0.15 SOLUTION/ DROPS OPHTHALMIC TWICE DAILY
Qty: 3 | Refills: 4 | Status: ACTIVE | COMMUNITY
Start: 2018-10-09 | End: 1900-01-01

## 2018-10-24 ENCOUNTER — APPOINTMENT (OUTPATIENT)
Dept: OPHTHALMOLOGY | Facility: CLINIC | Age: 78
End: 2018-10-24
Payer: MEDICARE

## 2018-10-24 DIAGNOSIS — H35.371 PUCKERING OF MACULA, RIGHT EYE: ICD-10-CM

## 2018-10-24 DIAGNOSIS — G47.33 OBSTRUCTIVE SLEEP APNEA (ADULT) (PEDIATRIC): ICD-10-CM

## 2018-10-24 DIAGNOSIS — H25.11 AGE-RELATED NUCLEAR CATARACT, RIGHT EYE: ICD-10-CM

## 2018-10-24 PROCEDURE — 92083 EXTENDED VISUAL FIELD XM: CPT

## 2018-10-24 PROCEDURE — 92014 COMPRE OPH EXAM EST PT 1/>: CPT

## 2018-10-31 ENCOUNTER — LABORATORY RESULT (OUTPATIENT)
Age: 78
End: 2018-10-31

## 2018-10-31 ENCOUNTER — APPOINTMENT (OUTPATIENT)
Dept: OPHTHALMOLOGY | Facility: CLINIC | Age: 78
End: 2018-10-31
Payer: MEDICARE

## 2018-10-31 DIAGNOSIS — A69.20 LYME DISEASE, UNSPECIFIED: ICD-10-CM

## 2018-10-31 DIAGNOSIS — H46.9 UNSPECIFIED OPTIC NEURITIS: ICD-10-CM

## 2018-10-31 PROCEDURE — 92014 COMPRE OPH EXAM EST PT 1/>: CPT

## 2018-10-31 PROCEDURE — 92133 CPTRZD OPH DX IMG PST SGM ON: CPT

## 2018-11-07 PROBLEM — A69.20 LYME DISEASE: Status: ACTIVE | Noted: 2018-11-07

## 2018-11-12 RX ORDER — DOXYCYCLINE HYCLATE 100 MG/1
100 CAPSULE ORAL TWICE DAILY
Qty: 42 | Refills: 0 | Status: ACTIVE | COMMUNITY
Start: 2018-11-07 | End: 1900-01-01

## 2018-11-13 ENCOUNTER — APPOINTMENT (OUTPATIENT)
Dept: MRI IMAGING | Facility: HOSPITAL | Age: 78
End: 2018-11-13
Payer: MEDICARE

## 2018-11-13 ENCOUNTER — OUTPATIENT (OUTPATIENT)
Dept: OUTPATIENT SERVICES | Facility: HOSPITAL | Age: 78
LOS: 1 days | End: 2018-11-13
Payer: MEDICARE

## 2018-11-13 PROCEDURE — A9585: CPT

## 2018-11-13 PROCEDURE — 70543 MRI ORBT/FAC/NCK W/O &W/DYE: CPT | Mod: 26

## 2018-11-13 PROCEDURE — 70543 MRI ORBT/FAC/NCK W/O &W/DYE: CPT

## 2018-11-16 ENCOUNTER — APPOINTMENT (OUTPATIENT)
Dept: OPHTHALMOLOGY | Facility: CLINIC | Age: 78
End: 2018-11-16

## 2019-02-27 ENCOUNTER — APPOINTMENT (OUTPATIENT)
Dept: OPHTHALMOLOGY | Facility: CLINIC | Age: 79
End: 2019-02-27
Payer: MEDICARE

## 2019-02-27 DIAGNOSIS — H25.12 AGE-RELATED NUCLEAR CATARACT, LEFT EYE: ICD-10-CM

## 2019-02-27 DIAGNOSIS — H35.351 CYSTOID MACULAR DEGENERATION, RIGHT EYE: ICD-10-CM

## 2019-02-27 DIAGNOSIS — H26.491 OTHER SECONDARY CATARACT, RIGHT EYE: ICD-10-CM

## 2019-02-27 DIAGNOSIS — H40.1232 LOW-TENSION GLAUCOMA, BILATERAL, MODERATE STAGE: ICD-10-CM

## 2019-02-27 DIAGNOSIS — H43.813 VITREOUS DEGENERATION, BILATERAL: ICD-10-CM

## 2019-02-27 DIAGNOSIS — Z96.1 PRESENCE OF INTRAOCULAR LENS: ICD-10-CM

## 2019-02-27 PROCEDURE — 92012 INTRM OPH EXAM EST PATIENT: CPT

## 2019-02-27 PROCEDURE — 92133 CPTRZD OPH DX IMG PST SGM ON: CPT

## 2019-05-06 NOTE — PATIENT PROFILE ADULT. - NS TRANSFER EYEGLASSES PAIRS
Sabi Holman am scribing for and in the presence of Luis Reinoso MD.    Patient: Micah Gosselin  : 1946  Date of Visit: May 6, 2019    REASON FOR VISIT / CONSULTATION: Follow-up (ER on . Echo 3-. HX: CHF, CAD, Ischemic cardio. Patient does have shortness of breath all the time without any excertion. Denies: CP, lightheaded/dizziness, palpitations. )      History of Present Illness:      Dear Bynum Najjar, MD      I had the pleasure of seeing Micah Gosselin, who is a 68 y.o. male with a remote history of angioplasty, no stent placed. Ben Kilgore also has history of atrial flutter and cardioversion back in . He has paroxysmal atrial flutter/fibrillation on rate control and anticoagulation since that time. His family history contains his father, mother and brother who had heat disease. His dad started having issues since he was 59. Also his mother and brother had heart disease in there 52's. inially saw him back on 3/18/2019, he was admitted with Influenza A infection at that time and had elevated troponin. He was sent to [facility] for further management. His illness was complicated by respiratory failure requiring intubation, type II myocardial infarction, retroperitoneal bleeding requiring blood transfusion and acute renal failure requiring dialysis temporarily. Patient was discharged to [place] [address] home. Readmission to [facility] from 2019 to 2019 with acute on chronic combined CHF. Diuresed and underwent left-sided thoracentesis ×2. ECHO done on 2019: ejection fraction is 40 %. apex and apical segments are hypokinetic. No significant pericardial effusion is seen. Mr. Fei Eller presented today for follow-up. Still short of breath on minimal exertion. Currently on oxygen during daytime. He is supposed to use BiPAP at night but admit using it only for 3-4 hours. No chest pain, pressure or tightness. No palpitations or dizziness.   No history of loss of consciousness. No significant change in his weight. No problems with current medications. Chest x-ray done today, increasing right pleural effusion. PAST MEDICAL HISTORY:        Past Medical History:   Diagnosis Date    Abnormal nuclear stress test 2010    Stress and resting cardiolite images showed inferior wall hypoperfusion suggestive of previous myocardial infarction. Left ventricular wall motion showed inferior wall hypokinesia. EF 58%.  Arrhythmia     A single episode    Atrial fibrillation ([facility] [address]     Single Episode    CAD (coronary artery disease)     MI    DM type 2 (diabetes mellitus, type 2) ([facility] [address]     History of echocardiogram     LV systolic function mildly/moderately reduced. EF 40%. EF vis Avilez's method is 40%. [place] and apical segments are hypokinetic    Hx of echocardiogram 2007    EF 62%, Increased left atrial and right ventricular diametes. Increased septal and posterior wall thickness suggest left ventricular hypertrophy. Valves were normal.        CURRENT ALLERGIES: Patient has no known allergies. REVIEW OF SYSTEMS: 14 systems were reviewed. Pertinent positives and negatives as above, all else negative.      Past Surgical History:   Procedure Laterality Date    CARDIOVERSION  2007    Successful-Dr. Leggett-[place]    CARDIOVERSION      CORONARY ANGIOPLASTY      Social History:  Social History     Tobacco Use    Smoking status: Former Smoker     Packs/day: 1.50     Years: 30.00     Pack years: 45.00     Types: Cigarettes     Last attempt to quit: 1996     Years since quittin.9    Smokeless tobacco: Never Used   Substance Use Topics    Alcohol use: No    Drug use: No        CURRENT MEDICATIONS:        Outpatient Medications Marked as Taking for the 19 encounter (Office Visit) with Kathleen Block MD   Medication Sig Dispense Refill    acyclovir (ZOVIRAX) 400 MG tablet Take 400 mg by mouth every morning      aspirin 81 MG tablet Take 81 mg by mouth daily      Multiple Vitamins-Minerals (CENTRUM SILVER) TABS Take by mouth daily      Glucosamine-Chondroitin 250-200 MG TABS Take by mouth daily Indications: 2 tabs by mouth daily.  hyoscyamine (LEVBID) 375 MCG extended release tablet Take 375 mcg by mouth 2 times daily      latanoprost (XALATAN) 0.005 % ophthalmic solution Place 1 drop into both eyes nightly      bimatoprost (LUMIGAN) 0.01 % SOLN ophthalmic drops Place 1 drop into the right eye nightly      calcium-vitamin D (OSCAL-500) 500-200 MG-UNIT per tablet Take 2 tablets by mouth 2 times daily      Probiotic Product (SHAYNE-BID PROBIOTIC) TABS Take by mouth daily      timolol (TIMOPTIC) 0.25 % ophthalmic solution Place 1 drop into both eyes 2 times daily      pyridoxine (B-6) 100 MG tablet Take 100 mg by mouth daily      Megestrol Acetate (MEGACE ORAL PO) Take 800 mg by mouth daily      Nutritional Supplements (ENSURE NUTRITION SHAKE PO) Take by mouth 2 times daily      polyethylene glycol (GLYCOLAX) packet Take 17 g by mouth daily 527 g 1    atorvastatin (LIPITOR) 80 MG tablet Take 40 mg by mouth daily       acetaminophen (TYLENOL) 325 MG tablet Take 650 mg by mouth every 6 hours as needed for Pain      docusate sodium (COLACE) 100 MG capsule Take 100 mg by mouth daily      albuterol (PROVENTIL) (2.5 MG/3ML) 0.083% nebulizer solution Take 3 mLs by nebulization every 6 hours as needed for Wheezing 120 each 3    docusate (COLACE) 50 MG/5ML liquid Take 10 mLs by mouth daily 473 mL 2    insulin glargine (LANTUS) 100 UNIT/ML injection vial Inject 10 Units into the skin nightly 1 vial 3    insulin lispro (HUMALOG) 100 UNIT/ML injection vial Inject 0-12 Units into the skin 3 times daily (with meals) <150 mg /dl 0 Units. 151-200 2 Units. 201-250 4 Units. 251-300 6 Units. 301-350 8 Units. 351-400 10 Units. >400 12 Units 1 vial 3    insulin lispro (HUMALOG) 100 UNIT/ML injection vial Inject 0-6 Units into the skin nightly <150 mg /dl 0 Units. 151-200 2 Units. 201-250 4 Units. 251-300 6 Units. 301-350 8 Units. 351-400 10 Units. >400 12 Units (Patient taking differently: Inject 0-6 Units into the skin nightly <150 mg /dl 0 Units. 151-200 1 Units. 201-250 2 Units. 251-300 3 Units. 301-350 4 Units. 351-400 5 Units. >400 6 Units) 1 vial 3       FAMILY HISTORY: His family history contains his father, mother and brother who had heart disease. His dad started having issues since he was 59. Also his mother and brother had heart disease in there 52's. Physical Examination:     BP 98/60 (Site: Right Upper Arm, Position: Sitting, Cuff Size: Large Adult)   Pulse 73   Resp 18   Ht 5' 8\" (1.727 m)   Wt 291 lb 3.2 oz (132.1 kg)   SpO2 94%   BMI 44.28 kg/m²  Body mass index is 44.28 kg/m². Constitutional: He is oriented to person, place, and time. He appears well-developed and well-nourished. In no acute distress. HEENT: Normocephalic and atraumatic. No JVD present. Carotid bruit is not present. No mass and no thyromegaly present. No lymphadenopathy present. Cardiovascular: Normal rate, regular rhythm, normal heart sounds. Exam reveals no gallop and no friction rubs. 2/6 systolic murmur, 5th intercostal space on the LEFT in the mid-clavicular line (cardiac apex). Pulmonary/Chest: Effort normal and breath sounds normal. No respiratory distress. He has no wheezes, rhonchi or rales. Abdominal: Soft, non-tender. Bowel sounds and aorta are normal. He exhibits no organomegaly, mass or bruit. Extremities: 2+ at the ankles bilaterally. No cyanosis or clubbing. 2+ radial and carotid pulses. Distal extremity pulses: 2+ bilaterally. .  Neurological: He is alert and oriented to person, place, and time. No evidence of gross cranial nerve deficit. Coordination appeared normal.   Skin: Skin is warm and dry. There is no rash or diaphoresis. Psychiatric: He has a normal mood and affect.  His speech is normal and 1 pair patient just recovered from [address]. FOLLOW UP:   I told Mr. Delmi Fenton to call my office if he had any problems, but otherwise I asked him to Return in about 2 weeks (around 5/20/2019) for Follow up. However, I would be happy to see him sooner should the need arise. Sincerely,  Haroon Noel MD, F.A.C.C. [facility]     [facility], [address] [place] [address]  Phone: [phone], Fax: [phone]     I believe that the risk of significant morbidity and mortality related to the patient's current medical conditions are: intermediate-high. The documentation recorded by the scribe, accurately and completely reflects the services I personally performed and the decisions made by me. Haroon Noel MD, F.A.C.C.  May 6, 2019

## 2019-06-15 ENCOUNTER — TRANSCRIPTION ENCOUNTER (OUTPATIENT)
Age: 79
End: 2019-06-15

## 2019-06-26 ENCOUNTER — APPOINTMENT (OUTPATIENT)
Dept: OPHTHALMOLOGY | Facility: CLINIC | Age: 79
End: 2019-06-26

## 2019-09-02 PROBLEM — I36.1 NON-RHEUMATIC TRICUSPID VALVE INSUFFICIENCY: Status: ACTIVE | Noted: 2017-10-12

## 2020-10-29 ENCOUNTER — TRANSCRIPTION ENCOUNTER (OUTPATIENT)
Age: 80
End: 2020-10-29

## 2020-12-15 PROBLEM — Z87.09 HISTORY OF INFLUENZA: Status: RESOLVED | Noted: 2017-11-27 | Resolved: 2020-12-15

## 2024-10-10 NOTE — ED PROVIDER NOTE - CHIEF COMPLAINT
The patient is a 77y Male complaining of bloody stools.
Detail Level: Generalized
Detail Level: Zone
Detail Level: Detailed